# Patient Record
Sex: MALE | Race: WHITE | URBAN - METROPOLITAN AREA
[De-identification: names, ages, dates, MRNs, and addresses within clinical notes are randomized per-mention and may not be internally consistent; named-entity substitution may affect disease eponyms.]

---

## 2023-04-07 ENCOUNTER — OFFICE (OUTPATIENT)
Dept: URBAN - METROPOLITAN AREA CLINIC 104 | Facility: CLINIC | Age: 78
Setting detail: OPHTHALMOLOGY
End: 2023-04-07
Payer: MEDICARE

## 2023-04-07 DIAGNOSIS — H01.004: ICD-10-CM

## 2023-04-07 DIAGNOSIS — H25.11: ICD-10-CM

## 2023-04-07 DIAGNOSIS — H25.13: ICD-10-CM

## 2023-04-07 DIAGNOSIS — H35.3121: ICD-10-CM

## 2023-04-07 DIAGNOSIS — H40.1111: ICD-10-CM

## 2023-04-07 DIAGNOSIS — H43.22: ICD-10-CM

## 2023-04-07 DIAGNOSIS — H01.001: ICD-10-CM

## 2023-04-07 DIAGNOSIS — H11.062: ICD-10-CM

## 2023-04-07 DIAGNOSIS — H01.002: ICD-10-CM

## 2023-04-07 DIAGNOSIS — H43.813: ICD-10-CM

## 2023-04-07 DIAGNOSIS — H18.413: ICD-10-CM

## 2023-04-07 PROBLEM — H01.005 BLEPHARITIS; RIGHT UPPER LID, RIGHT LOWER LID, LEFT UPPER LID, LEFT LOWER LID: Status: ACTIVE | Noted: 2023-04-07

## 2023-04-07 PROBLEM — H25.12 CATARACT SENILE NUCLEAR SCLEROSIS; RIGHT EYE, LEFT EYE, BOTH EYES: Status: ACTIVE | Noted: 2023-04-07

## 2023-04-07 PROCEDURE — 92014 COMPRE OPH EXAM EST PT 1/>: CPT | Performed by: SPECIALIST

## 2023-04-07 PROCEDURE — 92136 OPHTHALMIC BIOMETRY: CPT | Performed by: SPECIALIST

## 2023-04-07 PROCEDURE — 92134 CPTRZ OPH DX IMG PST SGM RTA: CPT | Performed by: SPECIALIST

## 2023-04-07 ASSESSMENT — REFRACTION_MANIFEST
OD_SPHERE: +4.00
OD_CYLINDER: -2.00
OD_AXIS: 135
OD_VA1: 20/50
OS_SPHERE: +3.00
OU_VA: 20/30
OS_VA1: 20/25
OS_ADD: +2.75
OS_CYLINDER: -2.25
OS_VA2: 20/25(J1)
OD_VA2: 20/40(J3)
OD_ADD: +2.75
OS_AXIS: 88

## 2023-04-07 ASSESSMENT — REFRACTION_CURRENTRX
OS_VPRISM_DIRECTION: PROGS
OD_OVR_VA: 20/
OD_AXIS: 118
OD_ADD: +2.50
OS_SPHERE: +3.25
OS_CYLINDER: -2.00
OS_ADD: +2.50
OS_AXIS: 70
OS_OVR_VA: 20/
OD_CYLINDER: -2.00
OD_SPHERE: +3.50
OD_VPRISM_DIRECTION: PROGS

## 2023-04-07 ASSESSMENT — PACHYMETRY
OD_CT_UM: 553
OS_CT_UM: 548
OS_CT_CORRECTION: 0
OD_CT_CORRECTION: -1

## 2023-04-07 ASSESSMENT — REFRACTION_AUTOREFRACTION
OD_AXIS: 86
OD_CYLINDER: -1.00
OS_CYLINDER: -2.25
OD_SPHERE: +4.75
OS_AXIS: 88
OS_SPHERE: +3.25

## 2023-04-07 ASSESSMENT — TONOMETRY
OS_IOP_MMHG: 18
OD_IOP_MMHG: 18

## 2023-04-07 ASSESSMENT — KERATOMETRY
OD_K2POWER_DIOPTERS: 43.72
OS_K2POWER_DIOPTERS: 44.64
OS_CYLPOWER_DEGREES: 2.13
OS_K2POWER_DIOPTERS: 44.64
OD_K1POWER_DIOPTERS: 41.93
OD_K2POWER_DIOPTERS: 43.72
OD_K1POWER_DIOPTERS: 41.93
OD_AXISANGLE2_DEGREES: 12
OS_AXISANGLE2_DEGREES: 1
OS_K1K2_AVERAGE: 43.58
OD_AXISANGLE_DEGREES: 12
OS_AXISANGLE_DEGREES: 1
OS_K1POWER_DIOPTERS: 42.51
OS_K1POWER_DIOPTERS: 42.51
OD_K1K2_AVERAGE: 42.83
OS_AXISANGLE_DEGREES: 1
OD_AXISANGLE_DEGREES: 12
OS_CYLAXISANGLE_DEGREES: 1
OD_CYLAXISANGLE_DEGREES: 12
OD_CYLPOWER_DEGREES: 1.79

## 2023-04-07 ASSESSMENT — SPHEQUIV_DERIVED
OS_SPHEQUIV: 1.875
OD_SPHEQUIV: 4.25
OD_SPHEQUIV: 3
OS_SPHEQUIV: 2.125

## 2023-04-07 ASSESSMENT — AXIALLENGTH_DERIVED
OD_AL: 22.26
OS_AL: 22.8574
OD_AL: 22.71
OS_AL: 22.77

## 2023-04-07 ASSESSMENT — VISUAL ACUITY
OS_BCVA: 20/50
OD_BCVA: 20/20-

## 2023-04-07 ASSESSMENT — CONFRONTATIONAL VISUAL FIELD TEST (CVF)
OS_FINDINGS: FULL
OD_FINDINGS: FULL

## 2023-04-07 ASSESSMENT — CORNEAL PTERYGIUM: OS_PTERYGIUM: NASAL 1MM

## 2023-04-07 ASSESSMENT — LID EXAM ASSESSMENTS
OD_BLEPHARITIS: RLL RUL 2+
OS_BLEPHARITIS: LLL LUL 2+

## 2023-04-23 ENCOUNTER — NON-APPOINTMENT (OUTPATIENT)
Age: 78
End: 2023-04-23

## 2023-05-19 PROBLEM — Z00.00 ENCOUNTER FOR PREVENTIVE HEALTH EXAMINATION: Status: ACTIVE | Noted: 2023-05-19

## 2023-05-23 ENCOUNTER — APPOINTMENT (OUTPATIENT)
Dept: FAMILY MEDICINE | Facility: CLINIC | Age: 78
End: 2023-05-23
Payer: MEDICARE

## 2023-05-23 VITALS
WEIGHT: 174 LBS | SYSTOLIC BLOOD PRESSURE: 133 MMHG | RESPIRATION RATE: 16 BRPM | HEIGHT: 66 IN | BODY MASS INDEX: 27.97 KG/M2 | DIASTOLIC BLOOD PRESSURE: 77 MMHG | HEART RATE: 76 BPM

## 2023-05-23 DIAGNOSIS — I10 ESSENTIAL (PRIMARY) HYPERTENSION: ICD-10-CM

## 2023-05-23 DIAGNOSIS — Z12.11 ENCOUNTER FOR SCREENING FOR MALIGNANT NEOPLASM OF COLON: ICD-10-CM

## 2023-05-23 DIAGNOSIS — Z01.818 ENCOUNTER FOR OTHER PREPROCEDURAL EXAMINATION: ICD-10-CM

## 2023-05-23 DIAGNOSIS — E78.5 HYPERLIPIDEMIA, UNSPECIFIED: ICD-10-CM

## 2023-05-23 PROCEDURE — 99204 OFFICE O/P NEW MOD 45 MIN: CPT | Mod: 25

## 2023-05-23 PROCEDURE — 36415 COLL VENOUS BLD VENIPUNCTURE: CPT

## 2023-05-23 RX ORDER — AMLODIPINE BESYLATE 5 MG/1
5 TABLET ORAL
Refills: 0 | Status: ACTIVE | COMMUNITY

## 2023-05-23 RX ORDER — BISOPROLOL FUMARATE 5 MG/1
5 TABLET, FILM COATED ORAL
Refills: 0 | Status: ACTIVE | COMMUNITY

## 2023-05-23 RX ORDER — SIMVASTATIN 40 MG/1
40 TABLET, FILM COATED ORAL
Refills: 0 | Status: ACTIVE | COMMUNITY

## 2023-05-23 NOTE — HISTORY OF PRESENT ILLNESS
[No Pertinent Cardiac History] : no history of aortic stenosis, atrial fibrillation, coronary artery disease, recent myocardial infarction, or implantable device/pacemaker [No Pertinent Pulmonary History] : no history of asthma, COPD, sleep apnea, or smoking [No Adverse Anesthesia Reaction] : no adverse anesthesia reaction in self or family member [(Patient denies any chest pain, claudication, dyspnea on exertion, orthopnea, palpitations or syncope)] : Patient denies any chest pain, claudication, dyspnea on exertion, orthopnea, palpitations or syncope [____ METs%] : [unfilled] METs% [Chronic Anticoagulation] : no chronic anticoagulation [Chronic Kidney Disease] : no chronic kidney disease [Diabetes] : no diabetes [FreeTextEntry1] : Cataract surgery [FreeTextEntry2] : 05/26/2023 [FreeTextEntry3] : Mahesh Lopez [FreeTextEntry4] : Mr. NICK GUNN is a pleasant 78 year old male with PMH of HTN, HLD, who comes in to the office to establish care and for cataract surgery preop evaluation. Patient moved from [place] 1 year ago.\par Patient reports that is able to walk 4 blocks or a flight of stairs without getting chest pain, palpitations s or shortness of breath. \par \par Previous PCP: In [place]

## 2023-05-23 NOTE — ASSESSMENT
[Patient Optimized for Surgery] : Patient optimized for surgery [No Further Testing Recommended] : no further testing recommended [As per surgery] : as per surgery [FreeTextEntry4] : \par Mr. GUNN is a 78 year male with PMH of HTN, HLD who comes to the office for preoperative evaluation for cataract surgery. Per surgical request from Dr Lopez, no labs/EKG needed , patient will undergo IV sedation with or w/o block but no general anesthesia. Patient has greater than 4 METS, is a moderate perioperative risk for Major adverse cardiac event. No further testing indicated at this time. Patient is medically optimized for this procedure. \par \par During conversation with patient extensive medical records were reviewed including but not limited to hospital records, out patient records, laboratory data \par In addition extensive time was also spent in reviewing diagnostic studies.\par \par Avoid NSAIDs, vitamins and aspirin containing products prior to surgery\par \par Counseling included abnormal lab results, differential diagnoses, treatment options, risks and benefits, lifestyle changes, current condition, medications, and dose adjustments. \par The patient was interactive, attentive, asked questions, and verbalized understanding.\par

## 2023-05-24 LAB
ALBUMIN SERPL ELPH-MCNC: 4.1 G/DL
ALP BLD-CCNC: 64 U/L
ALT SERPL-CCNC: 19 U/L
ANION GAP SERPL CALC-SCNC: 14 MMOL/L
AST SERPL-CCNC: 19 U/L
BILIRUB SERPL-MCNC: 0.4 MG/DL
BUN SERPL-MCNC: 20 MG/DL
CALCIUM SERPL-MCNC: 9.4 MG/DL
CHLORIDE SERPL-SCNC: 108 MMOL/L
CHOLEST SERPL-MCNC: 144 MG/DL
CO2 SERPL-SCNC: 24 MMOL/L
CREAT SERPL-MCNC: 1.01 MG/DL
EGFR: 75 ML/MIN/1.73M2
GLUCOSE SERPL-MCNC: 122 MG/DL
HDLC SERPL-MCNC: 60 MG/DL
LDLC SERPL CALC-MCNC: 49 MG/DL
NONHDLC SERPL-MCNC: 84 MG/DL
POTASSIUM SERPL-SCNC: 4 MMOL/L
PROT SERPL-MCNC: 6.5 G/DL
SODIUM SERPL-SCNC: 146 MMOL/L
TRIGL SERPL-MCNC: 175 MG/DL

## 2023-10-10 ENCOUNTER — NON-APPOINTMENT (OUTPATIENT)
Age: 78
End: 2023-10-10

## 2023-10-23 ENCOUNTER — NON-APPOINTMENT (OUTPATIENT)
Age: 78
End: 2023-10-23

## 2023-12-18 ENCOUNTER — NON-APPOINTMENT (OUTPATIENT)
Age: 78
End: 2023-12-18

## 2024-02-02 ENCOUNTER — OFFICE (OUTPATIENT)
Dept: URBAN - METROPOLITAN AREA CLINIC 104 | Facility: CLINIC | Age: 79
Setting detail: OPHTHALMOLOGY
End: 2024-02-02
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
## 2024-02-02 PROCEDURE — NO SHOW FE NO SHOW FEE: Performed by: SPECIALIST

## 2024-03-23 ENCOUNTER — NON-APPOINTMENT (OUTPATIENT)
Age: 79
End: 2024-03-23

## 2024-04-16 ENCOUNTER — NON-APPOINTMENT (OUTPATIENT)
Age: 79
End: 2024-04-16

## 2024-10-23 ENCOUNTER — NON-APPOINTMENT (OUTPATIENT)
Age: 79
End: 2024-10-23